# Patient Record
Sex: MALE | Race: WHITE
[De-identification: names, ages, dates, MRNs, and addresses within clinical notes are randomized per-mention and may not be internally consistent; named-entity substitution may affect disease eponyms.]

---

## 2019-01-29 ENCOUNTER — HOSPITAL ENCOUNTER (INPATIENT)
Dept: HOSPITAL 74 - YASAS | Age: 54
LOS: 4 days | Discharge: HOME | DRG: 897 | End: 2019-02-02
Attending: NEUROMUSCULOSKELETAL MEDICINE & OMM | Admitting: NEUROMUSCULOSKELETAL MEDICINE & OMM
Payer: COMMERCIAL

## 2019-01-29 VITALS — BODY MASS INDEX: 29.8 KG/M2

## 2019-01-29 DIAGNOSIS — F11.21: ICD-10-CM

## 2019-01-29 DIAGNOSIS — R00.0: ICD-10-CM

## 2019-01-29 DIAGNOSIS — I10: ICD-10-CM

## 2019-01-29 DIAGNOSIS — F10.230: Primary | ICD-10-CM

## 2019-01-29 PROCEDURE — HZ2ZZZZ DETOXIFICATION SERVICES FOR SUBSTANCE ABUSE TREATMENT: ICD-10-PCS | Performed by: NEUROMUSCULOSKELETAL MEDICINE & OMM

## 2019-01-29 RX ADMIN — ONDANSETRON PRN MG: 4 TABLET, ORALLY DISINTEGRATING ORAL at 17:33

## 2019-01-29 RX ADMIN — ALUMINUM HYDROXIDE, MAGNESIUM HYDROXIDE, AND SIMETHICONE PRN ML: 200; 200; 20 SUSPENSION ORAL at 16:59

## 2019-01-29 RX ADMIN — Medication SCH MG: at 22:09

## 2019-01-29 RX ADMIN — Medication PRN MG: at 22:10
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
## 2019-01-30 LAB
ALBUMIN SERPL-MCNC: 4.1 G/DL (ref 3.4–5)
ALP SERPL-CCNC: 56 U/L (ref 45–117)
ALT SERPL-CCNC: 86 U/L (ref 13–61)
ANION GAP SERPL CALC-SCNC: 15 MMOL/L (ref 8–16)
AST SERPL-CCNC: 57 U/L (ref 15–37)
BILIRUB SERPL-MCNC: 0.3 MG/DL (ref 0.2–1)
BUN SERPL-MCNC: 14 MG/DL (ref 7–18)
CALCIUM SERPL-MCNC: 8.4 MG/DL (ref 8.5–10.1)
CHLORIDE SERPL-SCNC: 101 MMOL/L (ref 98–107)
CO2 SERPL-SCNC: 23 MMOL/L (ref 21–32)
CREAT SERPL-MCNC: 0.9 MG/DL (ref 0.55–1.3)
DEPRECATED RDW RBC AUTO: 14.3 % (ref 11.9–15.9)
GLUCOSE SERPL-MCNC: 98 MG/DL (ref 74–106)
HCT VFR BLD CALC: 45.1 % (ref 35.4–49)
HGB BLD-MCNC: 15.4 GM/DL (ref 11.7–16.9)
MCH RBC QN AUTO: 33.7 PG (ref 25.7–33.7)
MCHC RBC AUTO-ENTMCNC: 34 G/DL (ref 32–35.9)
MCV RBC: 99.1 FL (ref 80–96)
PLATELET # BLD AUTO: 302 K/MM3 (ref 134–434)
PMV BLD: 8.5 FL (ref 7.5–11.1)
POTASSIUM SERPLBLD-SCNC: 4.1 MMOL/L (ref 3.5–5.1)
PROT SERPL-MCNC: 8.1 G/DL (ref 6.4–8.2)
RBC # BLD AUTO: 4.55 M/MM3 (ref 4–5.6)
SODIUM SERPL-SCNC: 138 MMOL/L (ref 136–145)
WBC # BLD AUTO: 9.3 K/MM3 (ref 4–10)

## 2019-01-30 RX ADMIN — ONDANSETRON PRN MG: 4 TABLET, ORALLY DISINTEGRATING ORAL at 10:10

## 2019-01-30 RX ADMIN — ALUMINUM HYDROXIDE, MAGNESIUM HYDROXIDE, AND SIMETHICONE PRN ML: 200; 200; 20 SUSPENSION ORAL at 05:31

## 2019-01-30 RX ADMIN — ONDANSETRON PRN MG: 4 TABLET, ORALLY DISINTEGRATING ORAL at 05:32

## 2019-01-30 RX ADMIN — ONDANSETRON PRN MG: 4 TABLET, ORALLY DISINTEGRATING ORAL at 22:13

## 2019-01-30 RX ADMIN — LISINOPRIL SCH MG: 10 TABLET ORAL at 10:08

## 2019-01-30 RX ADMIN — Medication SCH TAB: at 10:08

## 2019-01-30 RX ADMIN — Medication SCH MG: at 22:11

## 2019-01-30 RX ADMIN — AMLODIPINE BESYLATE SCH MG: 10 TABLET ORAL at 10:08

## 2019-01-30 RX ADMIN — Medication PRN MG: at 22:17

## 2019-01-30 RX ADMIN — ONDANSETRON PRN MG: 4 TABLET, ORALLY DISINTEGRATING ORAL at 19:58

## 2019-01-30 RX ADMIN — RANITIDINE SCH MG: 150 TABLET ORAL at 12:27

## 2019-01-30 RX ADMIN — RANITIDINE SCH MG: 150 TABLET ORAL at 22:11

## 2019-01-30 NOTE — PN
[facility] CIWA





- CIWA Score


Nausea/Vomitin-Mild Nausea/No Vomiting


Muscle Tremors: 3


Anxiety: 3


Agitation: 3


Paroxysmal Sweats: 1-Minimal Palms Moist


Orientation: 1-Uncertain about Date


Tacttile Disturbances: 0-None


Auditory Disturbances: 0-None


Visual Disturbances: 0-None


Headache: 1-Very Mild


CIWA-Ar Total Score: 13





[facility] Progress Note (SOAP)


Subjective: 





gi distress tremor sweating


acid reflux 


Objective: 





19 13:39


 Vital Signs











Temperature  97.1 F L  19 09:28


 


Pulse Rate  80   19 13:00


 


Respiratory Rate  20   19 13:00


 


Blood Pressure  147/102 H  19 09:28


 


O2 Sat by Pulse Oximetry (%)      








 Laboratory Last Values











WBC  9.3 K/mm3 (4.0-10.0)   19  06:00    


 


RBC  4.55 M/mm3 (4.00-5.60)   19  06:00    


 


Hgb  15.4 GM/dL (11.7-16.9)   19  06:00    


 


Hct  45.1 % (35.4-49)   19  06:00    


 


MCV  99.1 fl (80-96)  H  19  06:00    


 


MCH  33.7 pg (25.7-33.7)   19  06:00    


 


MCHC  34.0 g/dl (32.0-35.9)   19  06:00    


 


RDW  14.3 % (11.9-15.9)   19  06:00    


 


Plt Count  302 K/MM3 (134-434)   19  06:00    


 


MPV  8.5 fl (7.5-11.1)   19  06:00    


 


Sodium  138 mmol/L (136-145)   19  06:00    


 


Potassium  4.1 mmol/L (3.5-5.1)   19  06:00    


 


Chloride  101 mmol/L ()   19  06:00    


 


Carbon Dioxide  23 mmol/L (21-32)   19  06:00    


 


Anion Gap  15 MMOL/L (8-16)   19  06:00    


 


BUN  14 mg/dL (7-18)   19  06:00    


 


Creatinine  0.9 mg/dL (0.55-1.3)   19  06:00    


 


Creat Clearance w eGFR  > 60  (>60)   19  06:00    


 


Random Glucose  98 mg/dL ()   19  06:00    


 


Calcium  8.4 mg/dL (8.5-10.1)  L  19  06:00    


 


Total Bilirubin  0.3 mg/dL (0.2-1)   19  06:00    


 


AST  57 U/L (15-37)  H  19  06:00    


 


ALT  86 U/L (13-61)  H  19  06:00    


 


Alkaline Phosphatase  56 U/L ()   19  06:00    


 


Total Protein  8.1 g/dl (6.4-8.2)   19  06:00    


 


Albumin  4.1 g/dl (3.4-5.0)   19  06:00    








lab noted


Assessment: 





19 13:40


withdrawal sx


hypertension


Plan: 





continue detox

## 2019-01-31 RX ADMIN — Medication PRN MG: at 22:13

## 2019-01-31 RX ADMIN — LISINOPRIL SCH MG: 10 TABLET ORAL at 10:30

## 2019-01-31 RX ADMIN — AMLODIPINE BESYLATE SCH MG: 10 TABLET ORAL at 10:30

## 2019-01-31 RX ADMIN — RANITIDINE SCH MG: 150 TABLET ORAL at 22:12

## 2019-01-31 RX ADMIN — RANITIDINE SCH MG: 150 TABLET ORAL at 10:30

## 2019-01-31 RX ADMIN — Medication SCH TAB: at 10:29

## 2019-01-31 RX ADMIN — Medication SCH MG: at 22:11

## 2019-01-31 NOTE — PN
S CIWA





- CIWA Score


Nausea/Vomitin-No Nausea/No Vomiting


Muscle Tremors: 2


Anxiety: 2


Agitation: 2


Paroxysmal Sweats: 1-Minimal Palms Moist


Orientation: 0-Oriented


Tacttile Disturbances: 0-None


Auditory Disturbances: 0-None


Visual Disturbances: 0-None


Headache: 1-Very Mild


CIWA-Ar Total Score: 8





BHS Progress Note (SOAP)


Subjective: 





tremor sweating restlessness


Objective: 





19 10:36


 Vital Signs











Temperature  97 F L  19 09:09


 


Pulse Rate  65   19 09:09


 


Respiratory Rate  20   19 09:09


 


Blood Pressure  110/68   19 09:09


 


O2 Sat by Pulse Oximetry (%)      








 Laboratory Last Values











WBC  9.3 K/mm3 (4.0-10.0)   19  06:00    


 


RBC  4.55 M/mm3 (4.00-5.60)   19  06:00    


 


Hgb  15.4 GM/dL (11.7-16.9)   19  06:00    


 


Hct  45.1 % (35.4-49)   19  06:00    


 


MCV  99.1 fl (80-96)  H  19  06:00    


 


MCH  33.7 pg (25.7-33.7)   19  06:00    


 


MCHC  34.0 g/dl (32.0-35.9)   19  06:00    


 


RDW  14.3 % (11.9-15.9)   19  06:00    


 


Plt Count  302 K/MM3 (134-434)   19  06:00    


 


MPV  8.5 fl (7.5-11.1)   19  06:00    


 


Sodium  138 mmol/L (136-145)   19  06:00    


 


Potassium  4.1 mmol/L (3.5-5.1)   19  06:00    


 


Chloride  101 mmol/L ()   19  06:00    


 


Carbon Dioxide  23 mmol/L (21-32)   19  06:00    


 


Anion Gap  15 MMOL/L (8-16)   19  06:00    


 


BUN  14 mg/dL (7-18)   19  06:00    


 


Creatinine  0.9 mg/dL (0.55-1.3)   19  06:00    


 


Creat Clearance w eGFR  > 60  (>60)   19  06:00    


 


Random Glucose  98 mg/dL ()   19  06:00    


 


Calcium  8.4 mg/dL (8.5-10.1)  L  19  06:00    


 


Total Bilirubin  0.3 mg/dL (0.2-1)   19  06:00    


 


AST  57 U/L (15-37)  H  19  06:00    


 


ALT  86 U/L (13-61)  H  19  06:00    


 


Alkaline Phosphatase  56 U/L ()   19  06:00    


 


Total Protein  8.1 g/dl (6.4-8.2)   19  06:00    


 


Albumin  4.1 g/dl (3.4-5.0)   19  06:00    


 


RPR Titer  Nonreactive  (NONREACTIVE)   19  06:00    








lab noted


Assessment: 





19 10:36


withdrawal sx


Plan: 





continue detox

## 2019-02-01 RX ADMIN — RANITIDINE SCH MG: 150 TABLET ORAL at 22:11

## 2019-02-01 RX ADMIN — Medication SCH MG: at 22:11

## 2019-02-01 RX ADMIN — Medication SCH TAB: at 10:03

## 2019-02-01 RX ADMIN — Medication PRN MG: at 22:11

## 2019-02-01 RX ADMIN — LISINOPRIL SCH MG: 10 TABLET ORAL at 10:03

## 2019-02-01 RX ADMIN — AMLODIPINE BESYLATE SCH MG: 10 TABLET ORAL at 10:03

## 2019-02-01 RX ADMIN — RANITIDINE SCH: 150 TABLET ORAL at 10:03

## 2019-02-01 NOTE — PN
BHS Progress Note (SOAP)


Subjective: 





Patient Denies Any Current Withdrawal / Detox Symptoms and Reports That He 

Feels Well Overall At This Time.


Objective: 


PATIENT A & O X 3, OBSERVED AMBULATING ON UNIT. IN NO ACUTE DISTRESS.





02/01/19 18:02


 Vital Signs











Temperature  97.0 F L  02/01/19 16:58


 


Pulse Rate  74   02/01/19 16:58


 


Respiratory Rate  18   02/01/19 16:58


 


Blood Pressure  126/82   02/01/19 16:58


 


O2 Sat by Pulse Oximetry (%)      








 Laboratory Tests











  01/30/19 01/30/19 01/30/19





  06:00 06:00 06:00


 


WBC  9.3  


 


RBC  4.55  


 


Hgb  15.4  


 


Hct  45.1  


 


MCV  99.1 H  


 


MCH  33.7  


 


MCHC  34.0  


 


RDW  14.3  


 


Plt Count  302  


 


MPV  8.5  


 


Sodium   138 


 


Potassium   4.1 


 


Chloride   101 


 


Carbon Dioxide   23 


 


Anion Gap   15 


 


BUN   14 


 


Creatinine   0.9 


 


Creat Clearance w eGFR   > 60 


 


Random Glucose   98 


 


Calcium   8.4 L 


 


Total Bilirubin   0.3 


 


AST   57 H 


 


ALT   86 H 


 


Alkaline Phosphatase   56 


 


Total Protein   8.1 


 


Albumin   4.1 


 


RPR Titer    Nonreactive








LABS NOTED.


Assessment: 





02/01/19 18:02


WITHDRAWAL SYMPTOMS.


Plan: 





CONTINUE DETOX.





PATIENT SCHEDULED FOR D/C TOMORROW.

## 2019-02-02 VITALS — HEART RATE: 72 BPM | DIASTOLIC BLOOD PRESSURE: 79 MMHG | TEMPERATURE: 97.5 F | SYSTOLIC BLOOD PRESSURE: 121 MMHG

## 2019-02-02 NOTE — DS
[facility] Detox Discharge Summary


Admission Date: 


01/29/19





Discharge Date: 02/02/19





- History


Present History: Alcohol Dependence, Opioid Dependence


Additional Comments: 





PATIENT GOING HOME FOR WEEKEND TO ATTEND TO PERSONAL MATTER, THEN WILL GO TO 

[facility] ([place], [place]) FOR AFTERCARE ON 

MONDAY, 02/06/2019. PATIENT WAS DISCHARGED FROM DETOX UNIT NI STABLE MEDICAL 

CONDITION.





- Physical Exam Results


Vital Signs: 


 Vital Signs











Temperature  97.5 F L  02/02/19 06:13


 


Pulse Rate  72   02/02/19 06:13


 


Respiratory Rate  16   02/02/19 06:13


 


Blood Pressure  121/79   02/02/19 06:13


 


O2 Sat by Pulse Oximetry (%)      











Pertinent Admission Physical Exam Findings: 





WITHDRAWAL SYMPTOMS.





 Laboratory Tests











  01/30/19 01/30/19 01/30/19





  06:00 06:00 06:00


 


WBC  9.3  


 


RBC  4.55  


 


Hgb  15.4  


 


Hct  45.1  


 


MCV  99.1 H  


 


MCH  33.7  


 


MCHC  34.0  


 


RDW  14.3  


 


Plt Count  302  


 


MPV  8.5  


 


Sodium   138 


 


Potassium   4.1 


 


Chloride   101 


 


Carbon Dioxide   23 


 


Anion Gap   15 


 


BUN   14 


 


Creatinine   0.9 


 


Creat Clearance w eGFR   > 60 


 


Random Glucose   98 


 


Calcium   8.4 L 


 


Total Bilirubin   0.3 


 


AST   57 H 


 


ALT   86 H 


 


Alkaline Phosphatase   56 


 


Total Protein   8.1 


 


Albumin   4.1 


 


RPR Titer    Nonreactive








LABS NOTED.





- Treatment


Hospital Course: Detox Protocol Followed, Detoxed Safely, Responded well, 

Discharged Condition Good, Rehab Referral Accepted


Patient has Accepted a Rehab Referral to: [facility] (

[place], [place]).





- Medication


Discharge Medications: 


Ambulatory Orders





Unobtainable  01/29/19 











- Diagnosis


(1) Alcohol dependence


Status: Acute   


Qualifiers: 


   Substance use status: in withdrawal   Complication of substance-induced 

condition: uncomplicated   Qualified Code(s): F10.230 - Alcohol dependence with 

withdrawal, uncomplicated   





(2) Opioid dependence in remission


Status: Chronic   





- AMA


Did Patient Leave Against Medical Advice: No

## 2019-08-08 ENCOUNTER — HOSPITAL ENCOUNTER (INPATIENT)
Dept: HOSPITAL 74 - YASAS | Age: 54
LOS: 4 days | Discharge: HOME | DRG: 897 | End: 2019-08-12
Attending: SURGERY | Admitting: SURGERY
Payer: COMMERCIAL

## 2019-08-08 VITALS — BODY MASS INDEX: 28.3 KG/M2

## 2019-08-08 DIAGNOSIS — M54.5: ICD-10-CM

## 2019-08-08 DIAGNOSIS — R79.89: ICD-10-CM

## 2019-08-08 DIAGNOSIS — G89.29: ICD-10-CM

## 2019-08-08 DIAGNOSIS — H55.00: ICD-10-CM

## 2019-08-08 DIAGNOSIS — K21.9: ICD-10-CM

## 2019-08-08 DIAGNOSIS — F43.23: ICD-10-CM

## 2019-08-08 DIAGNOSIS — F19.24: ICD-10-CM

## 2019-08-08 DIAGNOSIS — F11.21: ICD-10-CM

## 2019-08-08 DIAGNOSIS — Z89.021: ICD-10-CM

## 2019-08-08 DIAGNOSIS — S80.212D: ICD-10-CM

## 2019-08-08 DIAGNOSIS — I10: ICD-10-CM
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
## 2019-08-08 DIAGNOSIS — F10.230: Primary | ICD-10-CM

## 2019-08-08 PROCEDURE — HZ2ZZZZ DETOXIFICATION SERVICES FOR SUBSTANCE ABUSE TREATMENT: ICD-10-PCS | Performed by: ALLERGY & IMMUNOLOGY

## 2019-08-08 RX ADMIN — Medication PRN MG: at 23:57

## 2019-08-08 RX ADMIN — ONDANSETRON PRN MG: 4 TABLET, ORALLY DISINTEGRATING ORAL at 23:58

## 2019-08-08 RX ADMIN — BACITRACIN ZINC SCH GM: 500 OINTMENT TOPICAL at 23:48
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
## 2019-08-09 LAB
ALBUMIN SERPL-MCNC: 3.7 G/DL (ref 3.4–5)
ALP SERPL-CCNC: 49 U/L (ref 45–117)
ALT SERPL-CCNC: 52 U/L (ref 13–61)
ANION GAP SERPL CALC-SCNC: 13 MMOL/L (ref 8–16)
AST SERPL-CCNC: 47 U/L (ref 15–37)
BILIRUB SERPL-MCNC: 0.6 MG/DL (ref 0.2–1)
BUN SERPL-MCNC: 14.7 MG/DL (ref 7–18)
CALCIUM SERPL-MCNC: 9.1 MG/DL (ref 8.5–10.1)
CHLORIDE SERPL-SCNC: 98 MMOL/L (ref 98–107)
CO2 SERPL-SCNC: 30 MMOL/L (ref 21–32)
CREAT SERPL-MCNC: 1.4 MG/DL (ref 0.55–1.3)
DEPRECATED RDW RBC AUTO: 14.5 % (ref 11.9–15.9)
GLUCOSE SERPL-MCNC: 125 MG/DL (ref 74–106)
HCT VFR BLD CALC: 38.8 % (ref 35.4–49)
HGB BLD-MCNC: 13.3 GM/DL (ref 11.7–16.9)
MCH RBC QN AUTO: 34 PG (ref 25.7–33.7)
MCHC RBC AUTO-ENTMCNC: 34.4 G/DL (ref 32–35.9)
MCV RBC: 98.8 FL (ref 80–96)
PLATELET # BLD AUTO: 278 K/MM3 (ref 134–434)
PMV BLD: 8.1 FL (ref 7.5–11.1)
POTASSIUM SERPLBLD-SCNC: 3.6 MMOL/L (ref 3.5–5.1)
PROT SERPL-MCNC: 7.4 G/DL (ref 6.4–8.2)
RBC # BLD AUTO: 3.92 M/MM3 (ref 4–5.6)
SODIUM SERPL-SCNC: 141 MMOL/L (ref 136–145)
WBC # BLD AUTO: 7.6 K/MM3 (ref 4–10)

## 2019-08-09 RX ADMIN — ONDANSETRON PRN MG: 4 TABLET, ORALLY DISINTEGRATING ORAL at 06:12

## 2019-08-09 RX ADMIN — Medication PRN MG: at 22:09

## 2019-08-09 RX ADMIN — BACITRACIN ZINC SCH GM: 500 OINTMENT TOPICAL at 22:09

## 2019-08-09 RX ADMIN — METHOCARBAMOL PRN MG: 500 TABLET ORAL at 22:10

## 2019-08-09 RX ADMIN — LISINOPRIL SCH MG: 10 TABLET ORAL at 10:03

## 2019-08-09 RX ADMIN — Medication SCH TAB: at 10:03

## 2019-08-09 RX ADMIN — Medication SCH MG: at 22:09

## 2019-08-09 RX ADMIN — BACITRACIN ZINC SCH GM: 500 OINTMENT TOPICAL at 10:03

## 2019-08-09 RX ADMIN — AMLODIPINE BESYLATE SCH MG: 10 TABLET ORAL at 10:03

## 2019-08-09 NOTE — CONSULT
[facility] Psychiatric Consult





- Data


Date of interview: 08/09/19


Admission source: [facility]


Identifying data: Readmission to [facility] for this 52 y/o Aa male self-

referred for detoxification (alcohol). Examined at [address] [place] [address]. Patient is , 

a father fo four, domiciled, unemployed, retired as per self-report and 

supported on SSI benefits.


Substance Abuse History: Smoking history: Never smoked.  Have you smoked in the 

past 12 months: No.  Aproximately how many cigarettes per day: 0.  Hx Chewing 

Tobacco Use: No.  Initiated information on smoking cessation: No.  - Substance 

& Tx. History.  Hx Alcohol Use: Yes.  Substance Use Type: Alcohol, Opiates.  Hx 

Substance Use Treatment: Yes (detox, rehab, past Suboxone).  - Substances 

abused.  ** Alcohol.  Substance route: Oral.  Frequency: Daily.  Amount used: 2 

pints of gin.  Age of first use: 51.  Date of last use: 08/08/19


Medical History: Remarkable for chronic lumbar pain and amputation of distal 

phalanx (third finger of right hand).


Psychiatric History: No reported history of psychiatric hospitalizations. 

Patient indicates brief period of suboxone maintenance (approximately five 

years ago). No history of psyciatric OPD care. Mr Sunshine denies history of 

suicide attempts.


Physical/Sexual Abuse/Trauma History: Stressors : unemployment, loneliness, 

separation of sons/daughters (away to college).


Additional Comment: Urine drug screen results: BZO-Benzodiazepines. Noted.





Mental Status Exam





- Mental Status Exam


Alert and Oriented to: Time, Place, Person


Cognitive Function: Good


Patient Appearance: Unkempt, Disheveled (obese)


Mood: Sad (dysphoric), Withdrawn


Affect: Mood Congruent, Constricted


Patient Behavior: Fatigued, Appropriate, Cooperative


Speech Pattern: Clear, Appropriate


Voice Loudness: Normal


Thought Process: Intact, Goal Oriented


Thought Disorder: Not Present


Hallucinations: Denies


Suicidal Ideation: Denies


Homicidal Ideation: Denies


Insight/Judgement: Fair


Sleep: Well


Appetite: Fair


Muscle strength/Tone: Normal


Gait/Station: Normal





Psychiatric Findings





- Problem List (Axis 1, 2,3)


(1) Alcohol dependence with uncomplicated withdrawal


Current Visit: Yes   Status: Acute   





(2) Opioid dependence in remission


Current Visit: Yes   Status: Chronic   





(3) Substance induced mood disorder


Current Visit: Yes   Status: Suspected   





(4) Adjustment disorder


Current Visit: Yes   Status: Suspected   





- Initial Treatment Plan


Initial Treatment Plan: Psychoeducation. Sleep hygiene. Detoxification. Support 

and motivational counseling provided in this session. MAT intervention for 

relapse prevention : discussed with patient. Expresses interest in vivitrol. AA 

meetings. Observation.

## 2019-08-09 NOTE — EKG
Test Reason : 

Blood Pressure : ***/*** mmHG

Vent. Rate : 096 BPM     Atrial Rate : 096 BPM

   P-R Int : 138 ms          QRS Dur : 078 ms

    QT Int : 428 ms       P-R-T Axes : 071 -35 047 degrees

   QTc Int : 540 ms

 

NORMAL SINUS RHYTHM

LEFT AXIS DEVIATION

PROLONGED QT

ABNORMAL ECG

NO PREVIOUS ECGS AVAILABLE

Confirmed by SHAYNE AMADO MD ([id number]) on 8/9/2019 1:45:18 PM

 

Referred By: Shai Crews           Confirmed By:SHAYNE AMADO MD

## 2019-08-09 NOTE — PN
S CIWA





- CIWA Score


Nausea/Vomiting: 3


Muscle Tremors: 3


Anxiety: 2


Agitation: 1-Slight > Activity


Paroxysmal Sweats: No Perspiration


Orientation: 0-Oriented


Tacttile Disturbances: 0-None


Auditory Disturbances: 2-Mild Harshness/Frighten


Visual Disturbances: 0-None


Headache: 0-None Present


CIWA-Ar Total Score: 11





BHS Progress Note (SOAP)


Subjective: 





Nausea, Tremors, Poor Appetite, Anxious.


Objective: 


PATIENT A & O X 3, OBSERVED AMBULATING ON DETOX UNIT UNASSISTED. IN NO ACUTE 

DISTRESS.





08/09/19 15:39


 Vital Signs











Temperature  98.8 F   08/09/19 13:49


 


Pulse Rate  96 H  08/09/19 14:30


 


Respiratory Rate  18   08/09/19 14:30


 


Blood Pressure  158/89   08/09/19 13:49


 


O2 Sat by Pulse Oximetry (%)      








 Laboratory Tests











  08/09/19 08/09/19 08/09/19





  07:00 07:00 07:00


 


WBC  7.6  


 


RBC  3.92 L  


 


Hgb  13.3  


 


Hct  38.8  


 


MCV  98.8 H  


 


MCH  34.0 H  


 


MCHC  34.4  


 


RDW  14.5  


 


Plt Count  278  


 


MPV  8.1  


 


Sodium   141 


 


Potassium   3.6 


 


Chloride   98 


 


Carbon Dioxide   30 


 


Anion Gap   13 


 


BUN   14.7 


 


Creatinine   1.4 H 


 


Est GFR (CKD-EPI)AfAm   65.09 


 


Est GFR (CKD-EPI)NonAf   56.16 


 


Random Glucose   125 H 


 


Calcium   9.1 


 


Total Bilirubin   0.6 


 


AST   47 H 


 


ALT   52 


 


Alkaline Phosphatase   49 


 


Total Protein   7.4 


 


Albumin   3.7 


 


RPR Titer    Nonreactive








LABS NOTED.


Assessment: 





08/09/19 15:41


WITHDRAWAL SYMPTOMS.


AZOTEMIA.





08/09/19 15:41


Plan: 





CONTINUE DETOX.


INCREASE DAILY PO WATER INTAKE.

## 2019-08-10 RX ADMIN — AMLODIPINE BESYLATE SCH MG: 10 TABLET ORAL at 10:11

## 2019-08-10 RX ADMIN — BACITRACIN ZINC SCH GM: 500 OINTMENT TOPICAL at 10:12

## 2019-08-10 RX ADMIN — Medication SCH MG: at 22:15

## 2019-08-10 RX ADMIN — Medication PRN MG: at 22:16

## 2019-08-10 RX ADMIN — Medication SCH TAB: at 10:11

## 2019-08-10 RX ADMIN — LISINOPRIL SCH MG: 10 TABLET ORAL at 10:11

## 2019-08-10 RX ADMIN — BACITRACIN ZINC SCH: 500 OINTMENT TOPICAL at 22:16

## 2019-08-10 RX ADMIN — METHOCARBAMOL PRN MG: 500 TABLET ORAL at 22:16

## 2019-08-10 NOTE — PN
S CIWA





- CIWA Score


Nausea/Vomitin-No Nausea/No Vomiting


Muscle Tremors: None


Anxiety: 3


Agitation: 2


Paroxysmal Sweats: 3


Orientation: 0-Oriented


Tacttile Disturbances: 0-None


Auditory Disturbances: 0-None


Visual Disturbances: 0-None


Headache: 2-Mild


CIWA-Ar Total Score: 10





BHS Progress Note (SOAP)


Subjective: 





c/o headache, sweats, and anxiety.


Objective: 





08/10/19 12:33


 Vital Signs











  08/10/19 08/10/19 08/10/19





  06:30 06:43 09:52


 


Temperature  98.2 F 96.8 F L


 


Pulse Rate  79 84


 


Respiratory 18 16 18





Rate   


 


Blood Pressure  107/79 127/77








 Lab Results











WBC  7.6 K/mm3 (4.0-10.0)   19  07:00    


 


RBC  3.92 M/mm3 (4.00-5.60)  L  19  07:00    


 


Hgb  13.3 GM/dL (11.7-16.9)   19  07:00    


 


Hct  38.8 % (35.4-49)   19  07:00    


 


MCV  98.8 fl (80-96)  H  19  07:00    


 


MCHC  34.4 g/dl (32.0-35.9)   19  07:00    


 


RDW  14.5 % (11.9-15.9)   19  07:00    


 


Plt Count  278 K/MM3 (134-434)   19  07:00    


 


Sodium  141 mmol/L (136-145)   19  07:00    


 


Potassium  3.6 mmol/L (3.5-5.1)   19  07:00    


 


Chloride  98 mmol/L ()   19  07:00    


 


Carbon Dioxide  30 mmol/L (21-32)   19  07:00    


 


Anion Gap  13 MMOL/L (8-16)   19  07:00    


 


BUN  14.7 mg/dL (7-18)   19  07:00    


 


Creatinine  1.4 mg/dL (0.55-1.3)  H  19  07:00    


 


Random Glucose  125 mg/dL ()  H  19  07:00    


 


Calcium  9.1 mg/dL (8.5-10.1)   19  07:00    








Labs noted.


Assessment: 





08/10/19 12:33


AOX3, in no acute respiratory distress


Full ROM, ambulating in the unit.


withdrawal symptoms.


Plan: 





continue detox.

## 2019-08-11 RX ADMIN — Medication SCH MG: at 22:16

## 2019-08-11 RX ADMIN — BACITRACIN ZINC SCH: 500 OINTMENT TOPICAL at 23:11

## 2019-08-11 RX ADMIN — LISINOPRIL SCH MG: 10 TABLET ORAL at 10:23

## 2019-08-11 RX ADMIN — Medication SCH TAB: at 10:23

## 2019-08-11 RX ADMIN — BACITRACIN ZINC SCH: 500 OINTMENT TOPICAL at 10:23

## 2019-08-11 RX ADMIN — Medication PRN MG: at 22:17

## 2019-08-11 RX ADMIN — AMLODIPINE BESYLATE SCH MG: 10 TABLET ORAL at 10:23

## 2019-08-11 NOTE — PN
S CIWA





- CIWA Score


Nausea/Vomitin-No Nausea/No Vomiting


Muscle Tremors: 2


Anxiety: 3


Agitation: 2


Paroxysmal Sweats: No Perspiration


Orientation: 0-Oriented


Tacttile Disturbances: 0-None


Auditory Disturbances: 0-None


Visual Disturbances: 0-None


Headache: 0-None Present


CIWA-Ar Total Score: 7





BHS Progress Note (SOAP)


Subjective: 





53 years old male admitted o 19 for acute alcohol withdrawal sx management


doing well with librium detox protocol 


mild tremor otherwise feeling ok


Objective: 





19 14:40


 Vital Signs











Temperature  97.1 F L  19 13:15


 


Pulse Rate  87   19 13:15


 


Respiratory Rate  18   19 13:15


 


Blood Pressure  126/87   19 13:15


 


O2 Sat by Pulse Oximetry (%)      








 Laboratory Last Values











WBC  7.6 K/mm3 (4.0-10.0)   19  07:00    


 


RBC  3.92 M/mm3 (4.00-5.60)  L  19  07:00    


 


Hgb  13.3 GM/dL (11.7-16.9)   19  07:00    


 


Hct  38.8 % (35.4-49)   19  07:00    


 


MCV  98.8 fl (80-96)  H  19  07:00    


 


MCH  34.0 pg (25.7-33.7)  H  19  07:00    


 


MCHC  34.4 g/dl (32.0-35.9)   19  07:00    


 


RDW  14.5 % (11.9-15.9)   19  07:00    


 


Plt Count  278 K/MM3 (134-434)   19  07:00    


 


MPV  8.1 fl (7.5-11.1)   19  07:00    


 


Sodium  141 mmol/L (136-145)   19  07:00    


 


Potassium  3.6 mmol/L (3.5-5.1)   19  07:00    


 


Chloride  98 mmol/L ()   19  07:00    


 


Carbon Dioxide  30 mmol/L (21-32)   19  07:00    


 


Anion Gap  13 MMOL/L (8-16)   19  07:00    


 


BUN  14.7 mg/dL (7-18)   19  07:00    


 


Creatinine  1.4 mg/dL (0.55-1.3)  H  19  07:00    


 


Est GFR (CKD-EPI)AfAm  65.09   19  07:00    


 


Est GFR (CKD-EPI)NonAf  56.16   19  07:00    


 


Random Glucose  125 mg/dL ()  H  19  07:00    


 


Calcium  9.1 mg/dL (8.5-10.1)   19  07:00    


 


Total Bilirubin  0.6 mg/dL (0.2-1)   19  07:00    


 


AST  47 U/L (15-37)  H  19  07:00    


 


ALT  52 U/L (13-61)   19  07:00    


 


Alkaline Phosphatase  49 U/L ()   19  07:00    


 


Total Protein  7.4 g/dl (6.4-8.2)   19  07:00    


 


Albumin  3.7 g/dl (3.4-5.0)   19  07:00    


 


RPR Titer  Nonreactive  (NONREACTIVE)   19  07:00    








lab noted


long history of hypertension managed well with amlodipin and lisinopril


Assessment: 





19 14:41


alcohol withdrawal sx


19 14:41


alert oriented x 3 no headaches no dizziness no shortness of breath


Plan: 





continue librium detox regimen

## 2019-08-12 VITALS — TEMPERATURE: 97 F | DIASTOLIC BLOOD PRESSURE: 75 MMHG | SYSTOLIC BLOOD PRESSURE: 110 MMHG | HEART RATE: 66 BPM

## 2019-08-12 RX ADMIN — AMLODIPINE BESYLATE SCH MG: 10 TABLET ORAL at 09:00

## 2019-08-12 RX ADMIN — BACITRACIN ZINC SCH: 500 OINTMENT TOPICAL at 09:28

## 2019-08-12 RX ADMIN — LISINOPRIL SCH MG: 10 TABLET ORAL at 09:00

## 2019-08-12 RX ADMIN — Medication SCH TAB: at 09:01

## 2019-08-12 NOTE — DS
[facility] Detox Discharge Summary


Admission Date: 


19





Discharge Date: 19





- History


Present History: Alcohol Dependence


Additional Comments: 





53 years old male admitted on 19 for acute alcohol withdrawal sx 

management


doing well with librium detox regimen no complication through out the detox stay


denies dizziness no shortness of breathe no nausea no vomiting denies trouble 

with urination





Pertinent Past History: 





long history of hypertension treated with amlodipine and lisinopril


effective 


bp acceptable range





patient prefers return home for clothing than return to [facility] 

for [facility]





- Physical Exam Results


Vital Signs: 


 Vital Signs











Temperature  97 F L  19 06:08


 


Pulse Rate  66   19 06:08


 


Respiratory Rate  18   19 06:08


 


Blood Pressure  110/75   19 06:08


 


O2 Sat by Pulse Oximetry (%)      











Pertinent Admission Physical Exam Findings: 





alcohol withdrawal sx


 Laboratory Last Values











WBC  7.6 K/mm3 (4.0-10.0)   19  07:00    


 


RBC  3.92 M/mm3 (4.00-5.60)  L  19  07:00    


 


Hgb  13.3 GM/dL (11.7-16.9)   19  07:00    


 


Hct  38.8 % (35.4-49)   19  07:00    


 


MCV  98.8 fl (80-96)  H  19  07:00    


 


MCH  34.0 pg (25.7-33.7)  H  19  07:00    


 


MCHC  34.4 g/dl (32.0-35.9)   19  07:00    


 


RDW  14.5 % (11.9-15.9)   19  07:00    


 


Plt Count  278 K/MM3 (134-434)   19  07:00    


 


MPV  8.1 fl (7.5-11.1)   19  07:00    


 


Sodium  141 mmol/L (136-145)   19  07:00    


 


Potassium  3.6 mmol/L (3.5-5.1)   19  07:00    


 


Chloride  98 mmol/L ()   19  07:00    


 


Carbon Dioxide  30 mmol/L (21-32)   19  07:00    


 


Anion Gap  13 MMOL/L (8-16)   19  07:00    


 


BUN  14.7 mg/dL (7-18)   19  07:00    


 


Creatinine  1.4 mg/dL (0.55-1.3)  H  19  07:00    


 


Est GFR (CKD-EPI)AfAm  65.09   19  07:00    


 


Est GFR (CKD-EPI)NonAf  56.16   19  07:00    


 


Random Glucose  125 mg/dL ()  H  19  07:00    


 


Calcium  9.1 mg/dL (8.5-10.1)   19  07:00    


 


Total Bilirubin  0.6 mg/dL (0.2-1)   19  07:00    


 


AST  47 U/L (15-37)  H  19  07:00    


 


ALT  52 U/L (13-61)   19  07:00    


 


Alkaline Phosphatase  49 U/L ()   19  07:00    


 


Total Protein  7.4 g/dl (6.4-8.2)   19  07:00    


 


Albumin  3.7 g/dl (3.4-5.0)   19  07:00    


 


RPR Titer  Nonreactive  (NONREACTIVE)   19  07:00    








lab noted


alert oriented x 3


S1S2


clear lung bilaterally


abdomen soft none tender





- Treatment


Hospital Course: Detox Protocol Followed, Detoxed Safely, Responded well, 

Discharged Condition Good, Rehab Referral Accepted


Patient has Accepted a Rehab Referral to: [facility]





- Medication


Discharge Medications: 


Ambulatory Orders





Amlodipine Besylate 10 mg PO DAILY #30 tablet 19 


Lisinopril [Prinivil] 10 mg PO DAILY #30 tablet 19 











- Diagnosis


(1) Alcohol dependence with uncomplicated withdrawal


Status: Acute   





(2) Essential (primary) hypertension


Status: Chronic   





(3) Substance induced mood disorder


Status: Suspected   





- AMA


Did Patient Leave Against Medical Advice: No





CIWA Score





- CIWA Score


Nausea/Vomitin-No Nausea/No Vomiting


Muscle Tremors: 1-None Visible, but Felt


Anxiety: 1-Mildly Anxious


Agitation: 1-Slight > Activity


Paroxysmal Sweats: No Perspiration


Orientation: 0-Oriented


Tacttile Disturbances: 0-None


Auditory Disturbances: 0-None


Visual Disturbances: 0-None


Headache: 0-None Present


CIWA-Ar Total Score: 3

## 2019-10-18 ENCOUNTER — HOSPITAL ENCOUNTER (INPATIENT)
Dept: HOSPITAL 74 - YASAS | Age: 54
LOS: 5 days | Discharge: HOME | End: 2019-10-23
Attending: ALLERGY & IMMUNOLOGY | Admitting: ALLERGY & IMMUNOLOGY
Payer: COMMERCIAL

## 2019-10-18 VITALS — BODY MASS INDEX: 28 KG/M2

## 2019-10-18 DIAGNOSIS — F10.230: Primary | ICD-10-CM

## 2019-10-18 DIAGNOSIS — R74.0: ICD-10-CM

## 2019-10-18 DIAGNOSIS — H55.00: ICD-10-CM

## 2019-10-18 DIAGNOSIS — F10.220: ICD-10-CM

## 2019-10-18 DIAGNOSIS — F19.24: ICD-10-CM

## 2019-10-18 DIAGNOSIS — E87.6: ICD-10-CM

## 2019-10-18 DIAGNOSIS — K21.9: ICD-10-CM

## 2019-10-18 DIAGNOSIS — Z89.021: ICD-10-CM

## 2019-10-18 DIAGNOSIS — I10: ICD-10-CM

## 2019-10-18 DIAGNOSIS — R00.0: ICD-10-CM

## 2019-10-18 PROCEDURE — HZ2ZZZZ DETOXIFICATION SERVICES FOR SUBSTANCE ABUSE TREATMENT: ICD-10-PCS | Performed by: ALLERGY & IMMUNOLOGY
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
## 2019-10-19 LAB
ALBUMIN SERPL-MCNC: 3.8 G/DL (ref 3.4–5)
ALP SERPL-CCNC: 60 U/L (ref 45–117)
ALT SERPL-CCNC: 98 U/L (ref 13–61)
ANION GAP SERPL CALC-SCNC: 19 MMOL/L (ref 8–16)
APPEARANCE UR: (no result)
AST SERPL-CCNC: 146 U/L (ref 15–37)
BACTERIA # UR AUTO: 63 /HPF
BILIRUB SERPL-MCNC: 1.3 MG/DL (ref 0.2–1)
BILIRUB UR STRIP.AUTO-MCNC: (no result) MG/DL
BUN SERPL-MCNC: 6.9 MG/DL (ref 7–18)
CALCIUM SERPL-MCNC: 8.2 MG/DL (ref 8.5–10.1)
CASTS URNS QL MICRO: 12 /LPF (ref 0–8)
CHLORIDE SERPL-SCNC: 93 MMOL/L (ref 98–107)
CO2 SERPL-SCNC: 28 MMOL/L (ref 21–32)
COLOR UR: (no result)
CREAT SERPL-MCNC: 0.9 MG/DL (ref 0.55–1.3)
DEPRECATED RDW RBC AUTO: 15.4 % (ref 11.9–15.9)
EPITH CASTS URNS QL MICRO: 9.7 /HPF
GLUCOSE SERPL-MCNC: 96 MG/DL (ref 74–106)
HCT VFR BLD CALC: 37.6 % (ref 35.4–49)
HGB BLD-MCNC: 13 GM/DL (ref 11.7–16.9)
KETONES UR QL STRIP: (no result)
LEUKOCYTE ESTERASE UR QL STRIP.AUTO: (no result)
MCH RBC QN AUTO: 34.7 PG (ref 25.7–33.7)
MCHC RBC AUTO-ENTMCNC: 34.5 G/DL (ref 32–35.9)
MCV RBC: 100.6 FL (ref 80–96)
NITRITE UR QL STRIP: POSITIVE
PH UR: 6.5 [PH] (ref 5–8)
PLATELET # BLD AUTO: 238 K/MM3 (ref 134–434)
PMV BLD: 8.3 FL (ref 7.5–11.1)
POTASSIUM SERPLBLD-SCNC: 2.9 MMOL/L (ref 3.5–5.1)
PROT SERPL-MCNC: 7.3 G/DL (ref 6.4–8.2)
PROT UR QL STRIP: (no result)
PROT UR QL STRIP: (no result)
RBC # BLD AUTO: 3.74 M/MM3 (ref 4–5.6)
SODIUM SERPL-SCNC: 140 MMOL/L (ref 136–145)
SP GR UR: 1.03 (ref 1.01–1.03)
UROBILINOGEN UR STRIP-MCNC: 1 MG/DL (ref 0.2–1)
WBC # BLD AUTO: 8.9 K/MM3 (ref 4–10)
WBC # UR AUTO: 4 /HPF (ref 0–5)

## 2019-10-19 RX ADMIN — Medication SCH TAB: at 10:18

## 2019-10-19 RX ADMIN — ALUMINUM HYDROXIDE, MAGNESIUM HYDROXIDE, AND SIMETHICONE PRN ML: 200; 200; 20 SUSPENSION ORAL at 10:18

## 2019-10-19 RX ADMIN — ALUMINUM HYDROXIDE, MAGNESIUM HYDROXIDE, AND SIMETHICONE PRN ML: 200; 200; 20 SUSPENSION ORAL at 01:55

## 2019-10-19 RX ADMIN — CYANOCOBALAMIN TAB 1000 MCG SCH MCG: 1000 TAB at 13:11

## 2019-10-19 RX ADMIN — LISINOPRIL SCH MG: 10 TABLET ORAL at 10:18

## 2019-10-19 RX ADMIN — AMLODIPINE BESYLATE SCH MG: 10 TABLET ORAL at 10:18

## 2019-10-19 RX ADMIN — Medication PRN MG: at 22:20

## 2019-10-19 RX ADMIN — POTASSIUM CHLORIDE SCH MEQ: 1500 TABLET, EXTENDED RELEASE ORAL at 22:19

## 2019-10-19 RX ADMIN — Medication SCH MG: at 22:19

## 2019-10-19 NOTE — PN
S CIWA





- CIWA Score


Nausea/Vomitin-Int. Nausea w/Dry Heave


Muscle Tremors: 3


Anxiety: 3


Agitation: 3


Paroxysmal Sweats: 2


Orientation: 0-Oriented


Tacttile Disturbances: 0-None


Auditory Disturbances: 0-None


Visual Disturbances: 0-None


Headache: 1-Very Mild


CIWA-Ar Total Score: 16





BHS Progress Note (SOAP)


Subjective: 





Pt states still with nausea. was admitted last night for alcohol detox





O:


 Vital Signs - 24 hr











  10/18/19 10/19/19 10/19/19





  19:08 03:30 06:05


 


Temperature 97.0 F L  98.7 F


 


Pulse Rate   102 H


 


Respiratory 16 18 18





Rate   


 


Blood Pressure 174/106 H  122/78














  10/19/19





  09:49


 


Temperature 97.8 F


 


Pulse Rate 102 H


 


Respiratory 20





Rate 


 


Blood Pressure 140/93








 Laboratory Tests











  10/19/19 10/19/19





  08:00 08:00


 


WBC  8.9 


 


RBC  3.74 L 


 


Hgb  13.0 


 


Hct  37.6 


 


MCV  100.6 H 


 


MCH  34.7 H 


 


MCHC  34.5 


 


RDW  15.4 


 


Plt Count  238 


 


MPV  8.3 


 


Sodium   140


 


Potassium   2.9 L*


 


Chloride   93 L


 


Carbon Dioxide   28


 


Anion Gap   19 H


 


BUN   6.9 L


 


Creatinine   0.9


 


Est GFR (CKD-EPI)AfAm   110.27


 


Est GFR (CKD-EPI)NonAf   95.14


 


Random Glucose   96


 


Calcium   8.2 L


 


Total Bilirubin   1.3 H


 


AST   146 H


 


ALT   98 H


 


Alkaline Phosphatase   60


 


Total Protein   7.3


 


Albumin   3.8








high liver enzymes


low K


high MCV





a/p- continue alcohol deto, will change to ativan


potassium supplement


Vit B12- macrocytosis

## 2019-10-20 RX ADMIN — FAMOTIDINE SCH MG: 20 TABLET ORAL at 14:18

## 2019-10-20 RX ADMIN — Medication SCH MG: at 22:24

## 2019-10-20 RX ADMIN — Medication PRN MG: at 22:24

## 2019-10-20 RX ADMIN — AMLODIPINE BESYLATE SCH MG: 10 TABLET ORAL at 10:36

## 2019-10-20 RX ADMIN — FAMOTIDINE SCH MG: 20 TABLET ORAL at 22:24

## 2019-10-20 RX ADMIN — POTASSIUM CHLORIDE SCH MEQ: 1500 TABLET, EXTENDED RELEASE ORAL at 10:36

## 2019-10-20 RX ADMIN — LISINOPRIL SCH MG: 10 TABLET ORAL at 10:36

## 2019-10-20 RX ADMIN — CYANOCOBALAMIN TAB 1000 MCG SCH MCG: 1000 TAB at 10:36

## 2019-10-20 RX ADMIN — Medication SCH TAB: at 10:36

## 2019-10-20 RX ADMIN — POTASSIUM CHLORIDE SCH MEQ: 1500 TABLET, EXTENDED RELEASE ORAL at 22:24

## 2019-10-20 NOTE — PN
[facility] CIWA





- CIWA Score


Nausea/Vomitin-Mild Nausea/No Vomiting


Muscle Tremors: 3


Anxiety: 2


Agitation: 2


Paroxysmal Sweats: 1-Minimal Palms Moist


Orientation: 0-Oriented


Tacttile Disturbances: 1-Very Mild Itch/Numbness


Auditory Disturbances: 1-Very Mild


Visual Disturbances: 0-None


Headache: 1-Very Mild


CIWA-Ar Total Score: 12





BHS Progress Note (SOAP)


Subjective: 





doing well with ativan detox regimen


less anxious 





long history of gerd


treated with zantac


Objective: 





10/20/19 13:45


 Vital Signs











Temperature  98.4 F   10/20/19 09:32


 


Pulse Rate  80   10/20/19 09:32


 


Respiratory Rate  18   10/20/19 09:32


 


Blood Pressure  106/74   10/20/19 09:32


 


O2 Sat by Pulse Oximetry (%)      








 Laboratory Last Values











WBC  8.9 K/mm3 (4.0-10.0)   10/19/19  08:00    


 


RBC  3.74 M/mm3 (4.00-5.60)  L  10/19/19  08:00    


 


Hgb  13.0 GM/dL (11.7-16.9)   10/19/19  08:00    


 


Hct  37.6 % (35.4-49)   10/19/19  08:00    


 


MCV  100.6 fl (80-96)  H  10/19/19  08:00    


 


MCH  34.7 pg (25.7-33.7)  H  10/19/19  08:00    


 


MCHC  34.5 g/dl (32.0-35.9)   10/19/19  08:00    


 


RDW  15.4 % (11.9-15.9)   10/19/19  08:00    


 


Plt Count  238 K/MM3 (134-434)   10/19/19  08:00    


 


MPV  8.3 fl (7.5-11.1)   10/19/19  08:00    


 


Sodium  140 mmol/L (136-145)   10/19/19  08:00    


 


Potassium  2.9 mmol/L (3.5-5.1)  L*  10/19/19  08:00    


 


Chloride  93 mmol/L ()  L  10/19/19  08:00    


 


Carbon Dioxide  28 mmol/L (21-32)   10/19/19  08:00    


 


Anion Gap  19 MMOL/L (8-16)  H  10/19/19  08:00    


 


BUN  6.9 mg/dL (7-18)  L  10/19/19  08:00    


 


Creatinine  0.9 mg/dL (0.55-1.3)   10/19/19  08:00    


 


Est GFR (CKD-EPI)AfAm  110.27   10/19/19  08:00    


 


Est GFR (CKD-EPI)NonAf  95.14   10/19/19  08:00    


 


Random Glucose  96 mg/dL ()   10/19/19  08:00    


 


Calcium  8.2 mg/dL (8.5-10.1)  L  10/19/19  08:00    


 


Total Bilirubin  1.3 mg/dL (0.2-1)  H  10/19/19  08:00    


 


AST  146 U/L (15-37)  H  10/19/19  08:00    


 


ALT  98 U/L (13-61)  H  10/19/19  08:00    


 


Alkaline Phosphatase  60 U/L ()   10/19/19  08:00    


 


Total Protein  7.3 g/dl (6.4-8.2)   10/19/19  08:00    


 


Albumin  3.8 g/dl (3.4-5.0)   10/19/19  08:00    


 


Urine Color  Orange   10/19/19  16:30    


 


Urine Appearance  Turbid   10/19/19  16:30    


 


Urine pH  6.5  (5.0-8.0)   10/19/19  16:30    


 


Ur Specific Gravity  1.030  (1.010-1.035)   10/19/19  16:30    


 


Urine Protein  2+  (NEGATIVE)  H  10/19/19  16:30    


 


Urine Glucose (UA)  Trace  (NEGATIVE)   10/19/19  16:30    


 


Urine Ketones  3+  (NEGATIVE)  H  10/19/19  16:30    


 


Urine Blood  Trace  (NEGATIVE)   10/19/19  16:30    


 


Urine Nitrite  Positive  (NEGATIVE)  H  10/19/19  16:30    


 


Urine Bilirubin  2+  (NEGATIVE)  H  10/19/19  16:30    


 


Urine Urobilinogen  1.0 mg/dL (0.2-1.0)   10/19/19  16:30    


 


Ur Leukocyte Esterase  Trace  (NEGATIVE)   10/19/19  16:30    


 


Urine WBC (Auto)  4 /hpf (0-5)   10/19/19  16:30    


 


Urine Casts (Auto)  12 /lpf (0-8)   10/19/19  16:30    


 


U Epithel Cells (Auto)  9.7 /HPF (0-5/HPF)   10/19/19  16:30    


 


Urine Bacteria (Auto)  63.0 /hpf (NEGATIVE)   10/19/19  16:30    


 


RPR Titer  Nonreactive  (NONREACTIVE)   10/19/19  08:00    








lab noted


low K+ 


continue K+ supplement


repeat K+ 10/21/19


ast elevation continue ativan detox regimen


10/20/19 13:47





Assessment: 





10/20/19 13:47


alcohol withdrawal sx


low K+


Plan: 





continue ativan detox regimen


repeat K+ 10/21/19

## 2019-10-21 RX ADMIN — FAMOTIDINE SCH MG: 20 TABLET ORAL at 22:25

## 2019-10-21 RX ADMIN — CYANOCOBALAMIN TAB 1000 MCG SCH MCG: 1000 TAB at 10:05

## 2019-10-21 RX ADMIN — POTASSIUM CHLORIDE SCH MEQ: 1500 TABLET, EXTENDED RELEASE ORAL at 10:04

## 2019-10-21 RX ADMIN — POTASSIUM CHLORIDE SCH MEQ: 1500 TABLET, EXTENDED RELEASE ORAL at 22:25

## 2019-10-21 RX ADMIN — Medication SCH TAB: at 10:04

## 2019-10-21 RX ADMIN — Medication SCH MG: at 22:25

## 2019-10-21 RX ADMIN — Medication PRN MG: at 22:25

## 2019-10-21 RX ADMIN — AMLODIPINE BESYLATE SCH MG: 10 TABLET ORAL at 10:04

## 2019-10-21 RX ADMIN — LISINOPRIL SCH MG: 10 TABLET ORAL at 10:04

## 2019-10-21 RX ADMIN — FAMOTIDINE SCH MG: 20 TABLET ORAL at 10:04

## 2019-10-21 NOTE — PN
[facility] CIWA





- CIWA Score


Nausea/Vomitin-Mild Nausea/No Vomiting


Muscle Tremors: 2


Anxiety: 2


Agitation: 2


Paroxysmal Sweats: 1-Minimal Palms Moist


Orientation: 0-Oriented


Tacttile Disturbances: 0-None


Auditory Disturbances: 0-None


Visual Disturbances: 0-None


Headache: 0-None Present


CIWA-Ar Total Score: 8





[facility] Progress Note (SOAP)


Subjective: 





doing well with ativan detox regimen


less tremor mild anxiety sleep better at night


Objective: 





10/21/19 14:59


 Vital Signs











Temperature  98.1 F   10/21/19 13:23


 


Pulse Rate  93 H  10/21/19 13:23


 


Respiratory Rate  20   10/21/19 13:23


 


Blood Pressure  142/91   10/21/19 13:23


 


O2 Sat by Pulse Oximetry (%)      








 Laboratory Last Values











WBC  8.9 K/mm3 (4.0-10.0)   10/19/19  08:00    


 


RBC  3.74 M/mm3 (4.00-5.60)  L  10/19/19  08:00    


 


Hgb  13.0 GM/dL (11.7-16.9)   10/19/19  08:00    


 


Hct  37.6 % (35.4-49)   10/19/19  08:00    


 


MCV  100.6 fl (80-96)  H  10/19/19  08:00    


 


MCH  34.7 pg (25.7-33.7)  H  10/19/19  08:00    


 


MCHC  34.5 g/dl (32.0-35.9)   10/19/19  08:00    


 


RDW  15.4 % (11.9-15.9)   10/19/19  08:00    


 


Plt Count  238 K/MM3 (134-434)   10/19/19  08:00    


 


MPV  8.3 fl (7.5-11.1)   10/19/19  08:00    


 


Sodium  140 mmol/L (136-145)   10/19/19  08:00    


 


Potassium  3.2 mmol/L (3.5-5.1)  L  10/21/19  09:00    


 


Chloride  93 mmol/L ()  L  10/19/19  08:00    


 


Carbon Dioxide  28 mmol/L (21-32)   10/19/19  08:00    


 


Anion Gap  19 MMOL/L (8-16)  H  10/19/19  08:00    


 


BUN  6.9 mg/dL (7-18)  L  10/19/19  08:00    


 


Creatinine  0.9 mg/dL (0.55-1.3)   10/19/19  08:00    


 


Est GFR (CKD-EPI)AfAm  110.27   10/19/19  08:00    


 


Est GFR (CKD-EPI)NonAf  95.14   10/19/19  08:00    


 


Random Glucose  96 mg/dL ()   10/19/19  08:00    


 


Calcium  8.2 mg/dL (8.5-10.1)  L  10/19/19  08:00    


 


Total Bilirubin  1.3 mg/dL (0.2-1)  H  10/19/19  08:00    


 


AST  146 U/L (15-37)  H  10/19/19  08:00    


 


ALT  98 U/L (13-61)  H  10/19/19  08:00    


 


Alkaline Phosphatase  60 U/L ()   10/19/19  08:00    


 


Total Protein  7.3 g/dl (6.4-8.2)   10/19/19  08:00    


 


Albumin  3.8 g/dl (3.4-5.0)   10/19/19  08:00    


 


Urine Color  Orange   10/19/19  16:30    


 


Urine Appearance  Turbid   10/19/19  16:30    


 


Urine pH  6.5  (5.0-8.0)   10/19/19  16:30    


 


Ur Specific Gravity  1.030  (1.010-1.035)   10/19/19  16:30    


 


Urine Protein  2+  (NEGATIVE)  H  10/19/19  16:30    


 


Urine Glucose (UA)  Trace  (NEGATIVE)   10/19/19  16:30    


 


Urine Ketones  3+  (NEGATIVE)  H  10/19/19  16:30    


 


Urine Blood  Trace  (NEGATIVE)   10/19/19  16:30    


 


Urine Nitrite  Positive  (NEGATIVE)  H  10/19/19  16:30    


 


Urine Bilirubin  2+  (NEGATIVE)  H  10/19/19  16:30    


 


Urine Urobilinogen  1.0 mg/dL (0.2-1.0)   10/19/19  16:30    


 


Ur Leukocyte Esterase  Trace  (NEGATIVE)   10/19/19  16:30    


 


Urine WBC (Auto)  4 /hpf (0-5)   10/19/19  16:30    


 


Urine Casts (Auto)  12 /lpf (0-8)   10/19/19  16:30    


 


U Epithel Cells (Auto)  9.7 /HPF (0-5/HPF)   10/19/19  16:30    


 


Urine Bacteria (Auto)  63.0 /hpf (NEGATIVE)   10/19/19  16:30    


 


RPR Titer  Nonreactive  (NONREACTIVE)   10/19/19  08:00    








lab noted


low K+


one potassium 40meq x 1 


ast elevation





10/21/19 15:02


repeat K+


repeat ast


Assessment: 





10/21/19 15:03


alcohol withdrawal sx


Plan: 





continue ativan detox regimen

## 2019-10-22 LAB
ALT SERPL-CCNC: 190 U/L (ref 13–61)
AST SERPL-CCNC: 232 U/L (ref 15–37)
POTASSIUM SERPLBLD-SCNC: 3.6 MMOL/L (ref 3.5–5.1)

## 2019-10-22 RX ADMIN — Medication SCH MG: at 22:02

## 2019-10-22 RX ADMIN — Medication PRN MG: at 22:03

## 2019-10-22 RX ADMIN — LISINOPRIL SCH MG: 10 TABLET ORAL at 10:35

## 2019-10-22 RX ADMIN — FAMOTIDINE SCH MG: 20 TABLET ORAL at 10:35

## 2019-10-22 RX ADMIN — CYANOCOBALAMIN TAB 1000 MCG SCH MCG: 1000 TAB at 10:38

## 2019-10-22 RX ADMIN — POTASSIUM CHLORIDE SCH MEQ: 1500 TABLET, EXTENDED RELEASE ORAL at 22:02

## 2019-10-22 RX ADMIN — FAMOTIDINE SCH MG: 20 TABLET ORAL at 22:02

## 2019-10-22 RX ADMIN — POTASSIUM CHLORIDE SCH MEQ: 1500 TABLET, EXTENDED RELEASE ORAL at 10:35

## 2019-10-22 RX ADMIN — AMLODIPINE BESYLATE SCH MG: 10 TABLET ORAL at 10:35

## 2019-10-22 RX ADMIN — Medication SCH TAB: at 10:33

## 2019-10-22 NOTE — PN
[facility] CIWA





- CIWA Score


Nausea/Vomitin-Mild Nausea/No Vomiting


Muscle Tremors: 1-None Visible, but Felt


Anxiety: 2


Agitation: 2


Paroxysmal Sweats: No Perspiration


Orientation: 0-Oriented


Tacttile Disturbances: 1-Very Mild Itch/Numbness


Auditory Disturbances: 0-None


Visual Disturbances: 0-None


Headache: 2-Mild


CIWA-Ar Total Score: 9





[facility] Progress Note (SOAP)


Subjective: 





alert,irritable,anxious,interrupted sleep,pain in the body


Objective: 





10/22/19 13:16


 Vital Signs











Temperature  97.9 F   10/22/19 09:10


 


Pulse Rate  96 H  10/22/19 09:10


 


Respiratory Rate  18   10/22/19 09:10


 


Blood Pressure  132/91   10/22/19 09:10


 


O2 Sat by Pulse Oximetry (%)      








 Laboratory Results - last 24 hr











  10/22/19 10/22/19





  09:17 09:17


 


Potassium  3.6 


 


AST  232 H 


 


ALT  190 H  Cancelled











Assessment: 





10/22/19 13:17


withdrawal symptom


Plan: 





continue detox ativan regimen,discharge in am

## 2019-10-23 VITALS — HEART RATE: 99 BPM | TEMPERATURE: 98.7 F | SYSTOLIC BLOOD PRESSURE: 135 MMHG | DIASTOLIC BLOOD PRESSURE: 83 MMHG

## 2019-10-23 NOTE — DS
[facility] Detox Discharge Summary


Admission Date: 


10/18/19





Discharge Date: 10/23/19





- History


Present History: Alcohol Dependence


Additional Comments: 





54 years old male admitted on 10/18/19 for alcohol withdrawl sx management


did well with ativan detox regimen


no complication through out the detox stay


alert oriented x 3





cardiac S1S2 regular rate rhythm


respiratory clear lung bilaterally on auscultation


abdomen soft obese no rebound tenderness








- Physical Exam Results


Vital Signs: 


 Vital Signs











Temperature  98.7 F   10/23/19 09:11


 


Pulse Rate  99 H  10/23/19 09:11


 


Respiratory Rate  18   10/23/19 09:11


 


Blood Pressure  135/83   10/23/19 09:11


 


O2 Sat by Pulse Oximetry (%)      











Pertinent Admission Physical Exam Findings: 





alcohol withdrawal sx


 Laboratory Last Values











WBC  8.9 K/mm3 (4.0-10.0)   10/19/19  08:00    


 


RBC  3.74 M/mm3 (4.00-5.60)  L  10/19/19  08:00    


 


Hgb  13.0 GM/dL (11.7-16.9)   10/19/19  08:00    


 


Hct  37.6 % (35.4-49)   10/19/19  08:00    


 


MCV  100.6 fl (80-96)  H  10/19/19  08:00    


 


MCH  34.7 pg (25.7-33.7)  H  10/19/19  08:00    


 


MCHC  34.5 g/dl (32.0-35.9)   10/19/19  08:00    


 


RDW  15.4 % (11.9-15.9)   10/19/19  08:00    


 


Plt Count  238 K/MM3 (134-434)   10/19/19  08:00    


 


MPV  8.3 fl (7.5-11.1)   10/19/19  08:00    


 


Sodium  140 mmol/L (136-145)   10/19/19  08:00    


 


Potassium  3.6 mmol/L (3.5-5.1)   10/22/19  09:17    


 


Chloride  93 mmol/L ()  L  10/19/19  08:00    


 


Carbon Dioxide  28 mmol/L (21-32)   10/19/19  08:00    


 


Anion Gap  19 MMOL/L (8-16)  H  10/19/19  08:00    


 


BUN  6.9 mg/dL (7-18)  L  10/19/19  08:00    


 


Creatinine  0.9 mg/dL (0.55-1.3)   10/19/19  08:00    


 


Est GFR (CKD-EPI)AfAm  110.27   10/19/19  08:00    


 


Est GFR (CKD-EPI)NonAf  95.14   10/19/19  08:00    


 


Random Glucose  96 mg/dL ()   10/19/19  08:00    


 


Calcium  8.2 mg/dL (8.5-10.1)  L  10/19/19  08:00    


 


Total Bilirubin  1.3 mg/dL (0.2-1)  H  10/19/19  08:00    


 


AST  232 U/L (15-37)  H  10/22/19  09:17    


 


ALT  190 U/L (13-61)  H  10/22/19  09:17    


 


Alkaline Phosphatase  60 U/L ()   10/19/19  08:00    


 


Total Protein  7.3 g/dl (6.4-8.2)   10/19/19  08:00    


 


Albumin  3.8 g/dl (3.4-5.0)   10/19/19  08:00    


 


Urine Color  Orange   10/19/19  16:30    


 


Urine Appearance  Turbid   10/19/19  16:30    


 


Urine pH  6.5  (5.0-8.0)   10/19/19  16:30    


 


Ur Specific Gravity  1.030  (1.010-1.035)   10/19/19  16:30    


 


Urine Protein  2+  (NEGATIVE)  H  10/19/19  16:30    


 


Urine Glucose (UA)  Trace  (NEGATIVE)   10/19/19  16:30    


 


Urine Ketones  3+  (NEGATIVE)  H  10/19/19  16:30    


 


Urine Blood  Trace  (NEGATIVE)   10/19/19  16:30    


 


Urine Nitrite  Positive  (NEGATIVE)  H  10/19/19  16:30    


 


Urine Bilirubin  2+  (NEGATIVE)  H  10/19/19  16:30    


 


Urine Urobilinogen  1.0 mg/dL (0.2-1.0)   10/19/19  16:30    


 


Ur Leukocyte Esterase  Trace  (NEGATIVE)   10/19/19  16:30    


 


Urine WBC (Auto)  4 /hpf (0-5)   10/19/19  16:30    


 


Urine Casts (Auto)  12 /lpf (0-8)   10/19/19  16:30    


 


U Epithel Cells (Auto)  9.7 /HPF (0-5/HPF)   10/19/19  16:30    


 


Urine Bacteria (Auto)  63.0 /hpf (NEGATIVE)   10/19/19  16:30    


 


RPR Titer  Nonreactive  (NONREACTIVE)   10/19/19  08:00    








lab noted


patient will bring in lab report to [facility] for ast alt elevation follow up





- Treatment


Hospital Course: Detox Protocol Followed, Detoxed Safely, Responded well, 

Discharged Condition Good, Rehab Referral Accepted


Patient has Accepted a Rehab Referral to: [facility]





- Medication


Discharge Medications: 


Ambulatory Orders





Amlodipine Besylate 10 mg PO DAILY #30 tablet 19 


Lisinopril [Prinivil] 10 mg PO DAILY #30 tablet 19 











- Diagnosis


(1) Alcohol dependence with uncomplicated withdrawal


Status: Acute   





(2) Essential (primary) hypertension


Status: Chronic   





(3) Substance induced mood disorder


Status: Suspected   





- AMA


Did Patient Leave Against Medical Advice: No





CIWA Score





- CIWA Score


Nausea/Vomitin-No Nausea/No Vomiting


Muscle Tremors: 1-None Visible, but Felt


Anxiety: 1-Mildly Anxious


Agitation: 1-Slight > Activity


Paroxysmal Sweats: No Perspiration


Orientation: 0-Oriented


Tacttile Disturbances: 0-None


Auditory Disturbances: 0-None


Visual Disturbances: 0-None


Headache: 1-Very Mild


CIWA-Ar Total Score: 4